# Patient Record
Sex: FEMALE | URBAN - METROPOLITAN AREA
[De-identification: names, ages, dates, MRNs, and addresses within clinical notes are randomized per-mention and may not be internally consistent; named-entity substitution may affect disease eponyms.]

---

## 2017-07-18 DIAGNOSIS — H53.10 SUBJECTIVE VISUAL DISTURBANCE: Primary | ICD-10-CM

## 2017-07-25 ENCOUNTER — OFFICE VISIT (OUTPATIENT)
Dept: OPHTHALMOLOGY | Facility: CLINIC | Age: 52
End: 2017-07-25
Attending: OPHTHALMOLOGY
Payer: COMMERCIAL

## 2017-07-25 DIAGNOSIS — H49.22 SIXTH NERVE PALSY OF LEFT EYE: Primary | ICD-10-CM

## 2017-07-25 DIAGNOSIS — H53.10 SUBJECTIVE VISUAL DISTURBANCE: ICD-10-CM

## 2017-07-25 PROCEDURE — 99215 OFFICE O/P EST HI 40 MIN: CPT

## 2017-07-25 PROCEDURE — 92060 SENSORIMOTOR EXAMINATION: CPT | Mod: ZF | Performed by: OPHTHALMOLOGY

## 2017-07-25 ASSESSMENT — TONOMETRY
IOP_METHOD: TONOPEN
OD_IOP_MMHG: 15
OS_IOP_MMHG: 13

## 2017-07-25 ASSESSMENT — VISUAL ACUITY
OS_CC: 20/20
CORRECTION_TYPE: GLASSES
METHOD: SNELLEN - LINEAR
OD_CC: 20/20

## 2017-07-25 ASSESSMENT — CONF VISUAL FIELD
OS_NORMAL: 1
OD_NORMAL: 1

## 2017-07-25 ASSESSMENT — REFRACTION_WEARINGRX
OS_CYLINDER: SPHERE
OD_AXIS: 055
OS_SPHERE: -4.25
OD_SPHERE: -4.25
OD_CYLINDER: +0.50

## 2017-07-25 ASSESSMENT — SLIT LAMP EXAM - LIDS
COMMENTS: NORMAL
COMMENTS: NORMAL

## 2017-07-25 ASSESSMENT — REFRACTION_FINALRX
OS_HBASE: OUT
OD_HPRISM: 7.5
OD_HBASE: OUT
OS_HPRISM: 7.5

## 2017-07-25 ASSESSMENT — EXTERNAL EXAM - LEFT EYE: OS_EXAM: LEFT ET

## 2017-07-25 ASSESSMENT — EXTERNAL EXAM - RIGHT EYE: OD_EXAM: NORMAL

## 2017-07-25 ASSESSMENT — CUP TO DISC RATIO
OD_RATIO: 0.2
OS_RATIO: 0.2

## 2017-07-25 NOTE — MR AVS SNAPSHOT
After Visit Summary   2017    Marisela Hernandez    MRN: [id number]           Patient Information     Date Of Birth          1965        Visit Information        Provider Department      2017 8:00 AM [facility]        Today's Diagnoses     Sixth nerve palsy of left eye    -  1    Subjective visual disturbance           Follow-ups after your visit        Who to contact     Please call your clinic at [phone] to:    Ask questions about your health    Make or cancel appointments    Discuss your medicines    Learn about your test results    Speak to your doctor   If you have compliments or concerns about an experience at your clinic, or if you wish to file a complaint, please contact [facility] Patient Relations at [phone] or email us at [email or web address]         Additional Information About Your Visit        MyChart Information     [employer] is an electronic gateway that provides easy, online access to your medical records. With [employer], you can request a clinic appointment, read your test results, renew a prescription or communicate with your care team.     To sign up for [employer] visit the website at [email or web address]   You will be asked to enter the access code listed below, as well as some personal information. Please follow the directions to create your username and password.     Your access code is: [email or web address]  Expires: 10/10/2017  6:31 AM     Your access code will  in 90 days. If you need help or a new code, please contact your [facility] or call [phone] for assistance.        Care EveryWhere ID     This is your Care EveryWhere ID. This could be used by other organizations to access your [facility] medical records  [id number]         Blood Pressure from Last 3 Encounters:   No data found for BP    Weight from Last 3 Encounters:   No data found for Wt               We Performed the Following     IOP Measurement     Sensorimotor          Today's Medication Changes          These changes are accurate as of: 7/25/17 11:22 AM.  If you have any questions, ask your nurse or doctor.               Stop taking these medicines if you haven't already. Please contact your care team if you have questions.     calcitonin (salmon) 200 UNIT/ACT nasal spray   Commonly known as:  MIACALCIN   Stopped by:  Jair Griffin MD           OYSTER SHELL CALCIUM + D PO   Stopped by:  Jair Griffin MD           TYLENOL PO   Stopped by:  Jair Griffin MD                    Primary Care Provider Office Phone # Fax #    Davis Tarik Argueta [phone]       [facility] [address] [facility] [address]        Equal Access to Services     [facility]: Hadii lloyd ely hadasho Soomaali, waaxda luqadaha, qaybta kaalmada adeegyada, cecille truong . So [facility] [phone].    ATENCIÓN: Si habla español, tiene a de león disposición servicios gratuitos de asistencia lingüística. [facility] [phone].    We comply with applicable federal civil rights laws and [place] laws. We do not discriminate on the basis of race, color, national origin, age, disability sex, sexual orientation or gender identity.            Thank you!     Thank you for choosing [facility]  for your care. Our goal is always to provide you with excellent care. Hearing back from our patients is one way we can continue to improve our services. Please take a few minutes to complete the written survey that you may receive in the mail after your visit with us. Thank you!             Your Updated Medication List - Protect others around you: Learn how to safely use, store and throw away your medicines at www.disposemymeds.org.          This list is accurate as of: 7/25/17 11:22 AM.  Always use your most recent med list.                   Brand Name Dispense  Instructions for use Diagnosis    ezetimibe-simvastatin 10-20 MG per tablet    VYTORIN     Take 1 tablet by mouth At Bedtime        lisinopril-hydrochlorothiazide 10-12.5 MG per tablet    PRINZIDE/ZESTORETIC     Take 1 tablet by mouth daily

## 2017-07-25 NOTE — PROGRESS NOTES
Assessment & Plan     Marisela Hernandez is a 51 year old female with the following diagnoses:   1. Sixth nerve palsy of left eye    2. Subjective visual disturbance       Now with with history of left sixth nerve palsy for 16 months now. Measurements have been stable since the last visit. 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 She certainly would benefit from eye muscle surgery or ground in prism now that her alignment is stable for long time.  She wants to pursue ground-in prism.  Follow up as needed for worsening symptoms with me.           Attending Physician Attestation:  Complete documentation of historical and exam elements from today's encounter can be found in the full encounter summary report (not reduplicated in this progress note).  I personally obtained the chief complaint(s) and history of present illness.  I confirmed and edited as necessary the review of systems, past medical/surgical history, family history, social history, and examination findings as documented by others; and I examined the patient myself.  I personally reviewed the relevant tests, images, and reports as documented above.  I formulated and edited as necessary the assessment and plan and discussed the findings and management plan with the patient and family. - Jair Griffin MD